# Patient Record
Sex: FEMALE | Race: WHITE | Employment: UNEMPLOYED | URBAN - METROPOLITAN AREA
[De-identification: names, ages, dates, MRNs, and addresses within clinical notes are randomized per-mention and may not be internally consistent; named-entity substitution may affect disease eponyms.]

---
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
## 2020-02-26 DIAGNOSIS — G47.00 INSOMNIA, UNSPECIFIED TYPE: ICD-10-CM

## 2020-02-26 RX ORDER — QUETIAPINE FUMARATE 100 MG/1
100 TABLET, FILM COATED ORAL
Qty: 30 TAB | Refills: 2 | Status: SHIPPED | OUTPATIENT
Start: 2020-02-26 | End: 2021-05-03 | Stop reason: ALTCHOICE

## 2020-02-26 NOTE — TELEPHONE ENCOUNTER
Requested Prescriptions     Pending Prescriptions Disp Refills    QUEtiapine (SEROQUEL) 100 mg tablet 30 Tab 2     Sig: Take 1 Tab by mouth nightly.      [facility] in [place]

## 2020-03-26 ENCOUNTER — VIRTUAL VISIT (OUTPATIENT)
Dept: NEUROLOGY | Age: 20
End: 2020-03-26

## 2020-03-26 VITALS — BODY MASS INDEX: 33.95 KG/M2 | HEIGHT: 65 IN

## 2020-03-26 RX ORDER — TOPIRAMATE 100 MG/1
100 CAPSULE, EXTENDED RELEASE ORAL DAILY
Qty: 30 CAP | Refills: 3 | Status: SHIPPED | OUTPATIENT
Start: 2020-03-26 | End: 2020-10-27

## 2020-03-26 NOTE — PATIENT INSTRUCTIONS

## 2020-03-26 NOTE — PROGRESS NOTES
Neurology Note    Chief Complaint   Patient presents with    Follow-up     headache       HPI/Subjective  Roderick Moody is a 23 y.o. female who presented to the neurology office for management of headaches. The patient has been having headaches since the age of 12.  It has been gradually progressing. The patient is having around 25 headaches a month. 2-3 days in a week as bad that lingers on for an additional 1 to 2 days. The headaches are in the left temporal area and it is throbbing in character. It is associated with Dizziness, photophobia, phonophobia and nausea. No vomiting. Interval history:  The patient is on Topamax 50 mg p.o. twice daily and states that her headaches are significantly better. She is having 2 headaches a month. She does complain of paresthesia in her arms 1 hour after she takes Topamax. She is drinking adequate water. No plans of getting pregnant. Baseline headache frequency: 25/month  Headache frequency now: 2 headaches a month    Risk Factors for headaches  Smoking: vapes  Coffee: 2/month  Tea: 2/month  Soda: 4-5 cans/day  Water: 10 glasses/day  Sleeps at 11 pm and wakes up at 2 pm.     Medications tried  Preventative therapy:  Propranolol 10 mg p.o. twice daily  Topamax    Abortive therapy:  Maxalt  Excedrin  Tylenol  Aleve      Current Outpatient Medications   Medication Sig    QUEtiapine (SEROQUEL) 100 mg tablet Take 1 Tab by mouth nightly.  sertraline (ZOLOFT) 50 mg tablet Take 1 Tab by mouth two (2) times a day.  topiramate (TOPAMAX) 25 mg tablet Wk 1 25 mg po qhs, wk 2 25 mg po bid, wk 3 25 mg am 50 mg qhs, wk 4 50 mg bid    levonorgestrel (KYLEENA) 17.5 mcg/24 hrs (5 yrs) 19.5 mg IUD IUD Kyleena    rizatriptan (MAXALT) 5 mg tablet TAKE 1 TABLET BY MOUTH ONCE AS NEEDED FOR MIGRAINE (1 DOSE)     No current facility-administered medications for this visit.       No Known Allergies  Past Medical History:   Diagnosis Date    Depression     Headache      No past surgical history on file. Family History   Problem Relation Age of Onset    Hypertension Mother         hyperthyroid    Thyroid Disease Mother     Collagen Dis Mother     Atrial Fibrillation Father     Thyroid Disease Brother         hyperthyroid    No Known Problems Brother     No Known Problems Brother      Social History     Tobacco Use    Smoking status: Former Smoker     Last attempt to quit: 2018     Years since quittin.7    Smokeless tobacco: Never Used    Tobacco comment: uses e cigs . .. vapes   Substance Use Topics    Alcohol use: Yes     Alcohol/week: 2.0 standard drinks     Types: 2 Shots of liquor per week     Comment: 2 x monthly    Drug use: Never       REVIEW OF SYSTEMS:   A ten system review of constitutional, cardiovascular, respiratory, musculoskeletal, endocrine, skin, SHEENT, genitourinary, psychiatric and neurologic systems was obtained and is unremarkable with the exception of headaches    EXAMINATION:   Visit Vitals  Ht 5' 5\" (1.651 m)   BMI 33.95 kg/m²        General:  Exam limited because of virtual visit. General appearance: Pt is in no acute distress     Neurological Examination:   Mental Status: AAO x3. Speech is fluent. Follows commands, has normal fund of knowledge, attention, short term recall, comprehension and insight. Cranial Nerves:  Extraocular movements are full. Facial movement intact. Hearing intact to conversation. Shoulder shrug symmetric. Tongue midline. Motor: Strength is symmetric in all 4 ext.      Sensation: Light touch - Normal    Coordination/Cerebellar: Intact to finger-nose-finger     Gait: Casual gait is normal.     Laboratory review:   Results for orders placed or performed in visit on 03/15/19   TSH 3RD GENERATION   Result Value Ref Range    TSH 0.761 0.450 - 7.310 uIU/mL   METABOLIC PANEL, COMPREHENSIVE   Result Value Ref Range    Glucose 84 65 - 99 mg/dL    BUN 9 6 - 20 mg/dL    Creatinine 0.66 0.57 - 1.00 mg/dL    GFR est non- >59 mL/min/1.73    GFR est  >59 mL/min/1.73    BUN/Creatinine ratio 14 9 - 23    Sodium 138 134 - 144 mmol/L    Potassium 4.5 3.5 - 5.2 mmol/L    Chloride 102 96 - 106 mmol/L    CO2 22 20 - 29 mmol/L    Calcium 9.6 8.7 - 10.2 mg/dL    Protein, total 7.0 6.0 - 8.5 g/dL    Albumin 4.4 3.5 - 5.5 g/dL    GLOBULIN, TOTAL 2.6 1.5 - 4.5 g/dL    A-G Ratio 1.7 1.2 - 2.2    Bilirubin, total <0.2 0.0 - 1.2 mg/dL    Alk. phosphatase 40 (L) 43 - 101 IU/L    AST (SGOT) 16 0 - 40 IU/L    ALT (SGPT) 12 0 - 32 IU/L   CBC WITH AUTOMATED DIFF   Result Value Ref Range    WBC 6.6 3.4 - 10.8 x10E3/uL    RBC 4.40 3.77 - 5.28 x10E6/uL    HGB 12.8 11.1 - 15.9 g/dL    HCT 39.4 34.0 - 46.6 %    MCV 90 79 - 97 fL    MCH 29.1 26.6 - 33.0 pg    MCHC 32.5 31.5 - 35.7 g/dL    RDW 13.5 12.3 - 15.4 %    PLATELET 123 373 - 310 x10E3/uL    NEUTROPHILS 84 Not Estab. %    Lymphocytes 12 Not Estab. %    MONOCYTES 3 Not Estab. %    EOSINOPHILS 1 Not Estab. %    BASOPHILS 0 Not Estab. %    ABS. NEUTROPHILS 5.5 1.4 - 7.0 x10E3/uL    Abs Lymphocytes 0.8 0.7 - 3.1 x10E3/uL    ABS. MONOCYTES 0.2 0.1 - 0.9 x10E3/uL    ABS. EOSINOPHILS 0.1 0.0 - 0.4 x10E3/uL    ABS. BASOPHILS 0.0 0.0 - 0.2 x10E3/uL    IMMATURE GRANULOCYTES 0 Not Estab. %    ABS. IMM. GRANS. 0.0 0.0 - 0.1 x10E3/uL   LIPID PANEL   Result Value Ref Range    Cholesterol, total 170 (H) 100 - 169 mg/dL    Triglyceride 126 (H) 0 - 89 mg/dL    HDL Cholesterol 52 >39 mg/dL    VLDL, calculated 25 5 - 40 mg/dL    LDL, calculated 93 0 - 109 mg/dL   HEMOGLOBIN A1C WITH EAG   Result Value Ref Range    Hemoglobin A1c 5.3 4.8 - 5.6 %    Estimated average glucose 105 mg/dL   CVD REPORT   Result Value Ref Range    INTERPRETATION Note        Imaging review:  None    Documentation review:  None    Assessment/Plan:   Rhonda Crawford is a 23 y.o. female who presented to the neurology office for management of headaches. Based on the description she does have chronic migraines.   She has not had any adequate trial of preventative medications. During the last visit she was started on Topamax and is presently taking 50 mg p.o. twice daily and the headaches have reduced from 25 a month to 2 headaches a month. She is having paresthesia related to Topamax. I am going to start the patient on Trokendi 100 mg p.o. nightly. We will see the patient back in 4 months. 3 most recent PHQ Screens 10/2/2019   Little interest or pleasure in doing things Several days   Feeling down, depressed, irritable, or hopeless Several days   Total Score PHQ 2 2     Primary care to address possible depression if PHQ-9 score is more than 9. ICD-10-CM ICD-9-CM    1. Chronic migraine G43.709 346.70       Thank you for allowing me to participate in the care of Ms. Fletcher. Please feel free to contact me if you have any questions. Sushma Bourne MD  Neurologist    CC: Pearl Mcghee MD  Fax: [phone]    This note was created using voice recognition software. Despite editing, there may be syntax errors. David Vegas was seen by synchronous (real-time) audio-video technology on 03/26/20. Consent:  She and/or her healthcare decision maker is aware that this patient-initiated Telehealth encounter is a billable service, with coverage as determined by her insurance carrier. She is aware that she may receive a bill and has provided verbal consent to proceed: Yes    I was in the office while conducting this encounter. Pursuant to the emergency declaration under the [facility], [facility] waiver authority and the [employer] Act, this Virtual  Visit was conducted, with patient's consent, to reduce the patient's risk of exposure to COVID-19 and provide continuity of care for an established patient.      Services were provided through a video synchronous discussion virtually to substitute for in-person clinic visit.

## 2020-03-31 ENCOUNTER — HOSPITAL ENCOUNTER (EMERGENCY)
Age: 20
Discharge: HOME | End: 2020-03-31
Attending: EMERGENCY MEDICINE | Admitting: EMERGENCY MEDICINE
Payer: COMMERCIAL

## 2020-03-31 VITALS
OXYGEN SATURATION: 99 % | HEART RATE: 97 BPM | DIASTOLIC BLOOD PRESSURE: 85 MMHG | RESPIRATION RATE: 16 BRPM | WEIGHT: 204.59 LBS | BODY MASS INDEX: 34.09 KG/M2 | SYSTOLIC BLOOD PRESSURE: 145 MMHG | HEIGHT: 65 IN | TEMPERATURE: 98.8 F

## 2020-03-31 DIAGNOSIS — S01.332A COMPLICATION OF LEFT EAR PIERCING, INITIAL ENCOUNTER: Primary | ICD-10-CM

## 2020-03-31 PROCEDURE — 99281 EMR DPT VST MAYX REQ PHY/QHP: CPT

## 2020-03-31 NOTE — DISCHARGE INSTRUCTIONS
Patient Education        Puncture Wounds: Care Instructions  Your Care Instructions    A puncture wound can happen anywhere on your body. These wounds tend to be narrower and deeper than cuts. A puncture wound is usually left open instead of being closed. This is because a puncture wound can be easily infected, and closing it can make infection even more likely. You will probably have a bandage over the wound. The doctor has checked you carefully, but problems can develop later. If you notice any problems or new symptoms, get medical treatment right away. Follow-up care is a key part of your treatment and safety. Be sure to make and go to all appointments, and call your doctor if you are having problems. It's also a good idea to know your test results and keep a list of the medicines you take. How can you care for yourself at home? · Keep the wound dry for the first 24 to 48 hours. After this, you can shower if your doctor okays it. Pat the wound dry. · Don't soak the wound, such as in a bathtub. Your doctor will tell you when it's safe to get the wound wet. · If your doctor told you how to care for your wound, follow your doctor's instructions. If you did not get instructions, follow this general advice:  ? After the first 24 to 48 hours, wash the wound with clean water 2 times a day. Don't use hydrogen peroxide or alcohol, which can slow healing. ? You may cover the wound with a thin layer of petroleum jelly, such as Vaseline, and a nonstick bandage. ? Apply more petroleum jelly and replace the bandage as needed. · Prop up the sore area on pillows anytime you sit or lie down during the next 3 days. Try to keep it above the level of your heart. This helps reduce swelling. · Avoid any activity that could cause your wound to get worse. · Be safe with medicines. Read and follow all instructions on the label. ? If the doctor gave you a prescription medicine for pain, take it as prescribed.   ? If you are not taking a prescription pain medicine, ask your doctor if you can take an over-the-counter medicine. · If your doctor prescribed antibiotics, take them as directed. Do not stop taking them just because you feel better. You need to take the full course of antibiotics. When should you call for help? Call your doctor now or seek immediate medical care if:    · You have new pain, or your pain gets worse.     · The wound starts to bleed, and blood soaks through the bandage. Oozing small amounts of blood is normal.     · The skin near the wound is cold or pale or changes color.     · You have tingling, weakness, or numbness near the wound.     · You have trouble moving the area near the wound.     · You have symptoms of infection, such as:  ? Increased pain, swelling, warmth, or redness around the wound. ? Red streaks leading from the wound. ? Pus draining from the wound. ? A fever.    Watch closely for changes in your health, and be sure to contact your doctor if:    · The wound is not closing (getting smaller).     · You do not get better as expected. Where can you learn more? Go to http://www.gray.com/  Enter V666 in the search box to learn more about \"Puncture Wounds: Care Instructions. \"  Current as of: June 26, 2019Content Version: 12.4  © 7404-0110 [employer]. Care instructions adapted under license by [employer] (which disclaims liability or warranty for this information). If you have questions about a medical condition or this instruction, always ask your healthcare professional. [place] [address] any warranty or liability for your use of this information. Patient Education   Wound Care: After Your Visit to the Emergency Room  Your Care Instructions  The care you need depends on the type of wound you have. Taking good care of your wound at home will help it heal quickly and will reduce your chance of infection.   Even though you have been released from the emergency room, you still need to watch for any problems. The doctor carefully checked you. But sometimes problems can develop later. If you have new symptoms, or if your symptoms do not get better, return to the emergency room or call your doctor right away. A visit to the emergency room is only one step in your treatment. Even if you feel better, you still need to do what your doctor recommends, such as going to all suggested follow-up appointments and taking medicines exactly as directed. This will help you recover and help prevent future problems. How can you care for yourself at home? · Clean the area with soap and water 2 times a day, or as your doctor tells you. Don't use hydrogen peroxide or alcohol, which can slow healing. ¨ Unless your doctor gives you other directions, cover the wound with a thin layer of antibiotic ointment, such as bacitracin, and a bandage. Do not use an ointment that contains neomycin, because it can irritate the skin. ¨ Apply more ointment and replace the bandage as your doctor tells you. ¨ If the bandage is stuck to a scab, soak it in warm water to soften the scab. This will make the bandage easier to remove. · Ask your doctor if you can take an over-the-counter pain medicine. Do not take two or more pain medicines at the same time unless the doctor told you to. · Some pain is normal with a wound, but do not ignore pain that is getting worse instead of better. You could have an infection. · Your doctor may have closed your wound with stitches (sutures), staples, or skin glue. ¨ If you have stitches, your doctor may remove them after several days to 2 weeks. Or you may have stitches that dissolve on their own. ¨ If you have staples, your doctor may remove them after 7 to 10 days. ¨ If your wound was closed with skin glue, the glue will wear off in a few days to 2 weeks. When should you call for help?   Return to the emergency room now if:  · You have signs of infection, such as:  ¨ Increased pain, swelling, warmth, or redness around the wound. ¨ Red streaks leading from the wound. ¨ Pus draining from the wound. ¨ Swollen lymph nodes in your neck, armpits, or groin. ¨ A fever. · The wound starts to bleed, and blood soaks through the bandage. (Oozing small amounts of blood is normal.)  Call your doctor today if:  · The wound is not getting better each day. Where can you learn more? Go to [email or web address]  Enter P907 in the search box to learn more about \"Wound Care: After Your Visit to the Emergency Room. \"   © 1851-2287 [employer]. Care instructions adapted under license by [employer] (which disclaims liability or warranty for this information). This care instruction is for use with your licensed healthcare professional. If you have questions about a medical condition or this instruction, always ask your healthcare professional. [place] [address] any warranty or liability for your use of this information.   Content Version: 9.3.18944; Last Revised: July 22, 2010

## 2020-03-31 NOTE — ED PROVIDER NOTES
EMERGENCY DEPARTMENT HISTORY AND PHYSICAL EXAM      Date: (Not on file)  Patient Name: Maudine Riedel    Please note that this dictation was completed with [employer], the computer voice recognition software. Quite often unanticipated grammatical, syntax, homophones, and other interpretive errors are inadvertently transcribed by the computer software. Please disregard these errors. Please excuse any errors that have escaped final proofreading. History of Presenting Illness     Chief Complaint   Patient presents with    Ear Pain     Pt c/o bleeding of her left ear x last night. Denies any pain or injury. History Provided By: Patient    HPI: Maudine Riedel, 23 y.o. female with PMHx significant for depression headache, presents ambulatory to the ED with cc of left ear bleeding that began last night. Patient reports that at night she was sleeping but she woke up because she felt some liquid coming from her ear. She will get her pillow and there is a small puddle of blood. Patient reports that she has not experienced any hearing loss or any trauma to the ear. She is unsure why this is happening. She reports that she has not experienced any further bleeding. She denies any pain, fever, chills. She does report that she has had a piercing to that year for the last year that she has not removed. PCP: Zoë Mcghee MD    There are no other complaints, changes, or physical findings at this time. Current Outpatient Medications   Medication Sig Dispense Refill    Trokendi  mg capsule Take 1 Cap by mouth daily. 30 Cap 3    QUEtiapine (SEROQUEL) 100 mg tablet Take 1 Tab by mouth nightly. 30 Tab 2    sertraline (ZOLOFT) 50 mg tablet Take 1 Tab by mouth two (2) times a day.  60 Tab 5    levonorgestrel (KYLEENA) 17.5 mcg/24 hrs (5 yrs) 19.5 mg IUD IUD Kyleena      rizatriptan (MAXALT) 5 mg tablet TAKE 1 TABLET BY MOUTH ONCE AS NEEDED FOR MIGRAINE (1 DOSE) 20 Tab 0       Past History Past Medical History:  Past Medical History:   Diagnosis Date    Depression     Headache        Past Surgical History:  No past surgical history on file. Family History:  Family History   Problem Relation Age of Onset    Hypertension Mother         hyperthyroid    Thyroid Disease Mother     Collagen Dis Mother     Atrial Fibrillation Father     Thyroid Disease Brother         hyperthyroid    No Known Problems Brother     No Known Problems Brother        Social History:  Social History     Tobacco Use    Smoking status: Former Smoker     Last attempt to quit: 2018     Years since quittin.7    Smokeless tobacco: Never Used    Tobacco comment: uses e cigs . .. vapes   Substance Use Topics    Alcohol use: Yes     Alcohol/week: 2.0 standard drinks     Types: 2 Shots of liquor per week     Comment: 2 x monthly    Drug use: Never       Allergies:  No Known Allergies      Review of Systems   Review of Systems   Constitutional: Negative for chills and fever. HENT: Positive for ear discharge (blood). Negative for congestion, dental problem, ear pain, hearing loss and rhinorrhea. Neurological: Negative for headaches. Physical Exam   Physical Exam  Vitals signs and nursing note reviewed. Constitutional:       General: She is not in acute distress. Appearance: She is well-developed. She is not diaphoretic. Comments: 23 y.o.  female in NAD  Communicates appropriately and in full sentences   HENT:      Head: Normocephalic and atraumatic. Right Ear: Tympanic membrane, ear canal and external ear normal. There is no impacted cerumen. Left Ear: Tympanic membrane, ear canal and external ear normal. There is no impacted cerumen. Ears:      Comments: Bar piercing to L upper ear that has blood from proximal piercing. No active bleeding, blood has formed scab. TM intact. No AOM or OE. No clinical concern for mastoiditis. Nose: No congestion or rhinorrhea. Eyes:      General:         Right eye: No discharge. Left eye: No discharge. Conjunctiva/sclera: Conjunctivae normal.      Pupils: Pupils are equal, round, and reactive to light. Neck:      Musculoskeletal: Normal range of motion and neck supple. Comments: No nuchal rigidity or meningeal signs  Pulmonary:      Effort: Pulmonary effort is normal. No respiratory distress. Musculoskeletal: Normal range of motion. General: No tenderness or deformity. Comments: No neurologic or vascular compromise on exam.    Skin:     General: Skin is warm and dry. Coloration: Skin is not pale. Findings: No erythema or rash. Neurological:      Mental Status: She is alert and oriented to person, place, and time. Psychiatric:         Behavior: Behavior normal.           Diagnostic Study Results     No formal testing initiated. Medical Decision Making   I am the first provider for this patient. I reviewed the vital signs, available nursing notes, past medical history, past surgical history, family history and social history. Vital Signs-Reviewed the patient's vital signs. Patient Vitals for the past 12 hrs:   Temp Pulse Resp BP SpO2   03/31/20 1509 98.8 °F (37.1 °C) 97 16 145/85 99 %         Records Reviewed: Nursing Notes and Old Medical Records    Provider Notes (Medical Decision Making):   DDx; piercing complication, AOM, tinnitus, OE.     ED Course:   Initial assessment performed. The patients presenting problems have been discussed, and they are in agreement with the care plan formulated and outlined with them. I have encouraged them to ask questions as they arise throughout their visit. DISCHARGE NOTE:  Rozina Santiago Chance's  results have been reviewed with her. She has been counseled regarding her diagnosis.   She verbally conveys understanding and agreement of the signs, symptoms, diagnosis, treatment and prognosis and additionally agrees to follow up as recommended with Dr. Brett Nieto MD in 24 - 48 hours. She also agrees with the care-plan and conveys that all of her questions have been answered. I have also put together some discharge instructions for her that include: 1) educational information regarding their diagnosis, 2) how to care for their diagnosis at home, as well a 3) list of reasons why they would want to return to the ED prior to their follow-up appointment, should their condition change. She and/or family's questions have been answered. I have encouraged them to see the official results in [facility]\" or to retrieve the specifics of their results from medical records. PLAN:  1. Return precautions as discussed  2. Follow-up with providers as directed  3. Medications as prescribed    Return to ED if worse     Diagnosis     Clinical Impression:   1. Complication of left ear piercing, initial encounter        Current Discharge Medication List          Follow-up Information     Follow up With Specialties Details Why Contact Info    Maranda Mcghee MD Family Practice Schedule an appointment as soon as possible for a visit in 2 days If symptoms worsen [address]  [address]  [phone]                This note will not be viewable in [address]

## 2020-07-20 ENCOUNTER — TELEPHONE (OUTPATIENT)
Dept: NEUROLOGY | Age: 20
End: 2020-07-20

## 2020-07-20 NOTE — TELEPHONE ENCOUNTER
Ariella Tomas [employer] 4[employer]0 RTL ([address])  Covered: Retail, Mail Order, Specialty Unknown: Long-Term Care               Member ID: [id number] 2000 - F     Group ID: [id number]      Group Name: Carmelo Ward [facility] 66 [id number] [place], [place] [address]    Use As Primary Coverage    N  ADV  BIN [id number]    Sent to [facility] - status pending.      Castro: Parth Lundy

## 2020-07-21 ENCOUNTER — TELEPHONE (OUTPATIENT)
Dept: NEUROLOGY | Age: 20
End: 2020-07-21

## 2020-07-22 ENCOUNTER — TELEPHONE (OUTPATIENT)
Dept: NEUROLOGY | Age: 20
End: 2020-07-22

## 2020-07-22 NOTE — TELEPHONE ENCOUNTER
Krissy Tobias [facility]  approval 7/21/20 - 10/21/20    PA [id number] [facility] to [facility] in [place].

## 2020-07-29 ENCOUNTER — OFFICE VISIT (OUTPATIENT)
Dept: NEUROLOGY | Age: 20
End: 2020-07-29

## 2020-07-29 VITALS
SYSTOLIC BLOOD PRESSURE: 142 MMHG | BODY MASS INDEX: 34.32 KG/M2 | HEART RATE: 100 BPM | OXYGEN SATURATION: 98 % | WEIGHT: 206 LBS | DIASTOLIC BLOOD PRESSURE: 82 MMHG | HEIGHT: 65 IN

## 2020-07-29 RX ORDER — SUMATRIPTAN 50 MG/1
TABLET, FILM COATED ORAL
Qty: 9 TAB | Refills: 2 | Status: SHIPPED | OUTPATIENT
Start: 2020-07-29 | End: 2021-02-02

## 2020-10-27 RX ORDER — TOPIRAMATE 100 MG/1
CAPSULE, EXTENDED RELEASE ORAL
Qty: 30 CAP | Refills: 3 | Status: SHIPPED | OUTPATIENT
Start: 2020-10-27 | End: 2021-02-02 | Stop reason: ALTCHOICE

## 2020-11-03 ENCOUNTER — TELEPHONE (OUTPATIENT)
Dept: NEUROLOGY | Age: 20
End: 2020-11-03

## 2020-11-03 NOTE — TELEPHONE ENCOUNTER
----- Message from Chris Guajardo sent at 11/3/2020  2:13 PM EST -----  Regarding: HALEY/REFILL  Contact: [phone]  Medication Refill    Caller (if not patient): N/A      Relationship of caller (if not patient): N/A      Best contact number(s): [phone]      Name of medication and dosage if known: Topamax 100 mg      Is patient out of this medication (yes/no): Yes      Pharmacy name: [facility])    Pharmacy listed in chart? (yes/no): Yes    Pharmacy phone number: N/A      Details to clarify the request: Refill request for Topamax 100 mg          Chris Guajardo

## 2020-11-23 NOTE — TELEPHONE ENCOUNTER
Sent Trokendi 100 mg to [facility] via [facility]. Status is pending for clinical return.   (Key: [id number])

## 2021-02-02 ENCOUNTER — VIRTUAL VISIT (OUTPATIENT)
Dept: NEUROLOGY | Age: 21
End: 2021-02-02
Payer: COMMERCIAL

## 2021-02-02 PROCEDURE — 99214 OFFICE O/P EST MOD 30 MIN: CPT | Performed by: PSYCHIATRY & NEUROLOGY

## 2021-02-02 RX ORDER — SUMATRIPTAN 50 MG/1
TABLET, FILM COATED ORAL
Qty: 9 TAB | Refills: 2 | Status: SHIPPED | OUTPATIENT
Start: 2021-02-02 | End: 2021-11-22 | Stop reason: SDUPTHER

## 2021-02-02 NOTE — PROGRESS NOTES
Neurology Note    Chief Complaint   Patient presents with    Follow-up     hasnt been taking medication trokendi d/t ins not covering     Migraine     2 a week , headaches daily        HPI/Subjective  Richi Khanna is a 21 y.o. female who presented to the neurology office for management of headaches. The patient has been having headaches since the age of 12.  It has been gradually progressing. The patient is having around 25 headaches a month. 2-3 days in a week as bad that lingers on for an additional 1 to 2 days. The headaches are in the left temporal area and it is throbbing in character. It is associated with Dizziness, photophobia, phonophobia and nausea. No vomiting. Interval history:  Patient is off Trokendi for the last 3 months and her headaches have worsened. She is now having daily headaches. When she was on Trokendi she was having 3-4 headaches a month. Baseline headache frequency: 25/month  Headache frequency now: daily headaches and 1-2 bad ones/week    Risk Factors for headaches  Smoking: vapes  Coffee: 2/month  Tea: 2/month  Soda: 4-5 cans/day  Water: 10 glasses/day  Sleeps at 11 pm and wakes up at 2 pm.     Medications tried  Preventative therapy:  Propranolol 10 mg p.o. twice daily  Topamax  Trokendi    Abortive therapy:  Maxalt  Excedrin  Tylenol  Aleve      Current Outpatient Medications   Medication Sig    SUMAtriptan (IMITREX) 50 mg tablet 1 tab at onset of headache, can repeat X 1 in 2 hrs if HA persists. Not more than 10 days/month.  QUEtiapine (SEROQUEL) 100 mg tablet Take 1 Tab by mouth nightly.  sertraline (ZOLOFT) 50 mg tablet Take 1 Tab by mouth two (2) times a day.  levonorgestrel (KYLEENA) 17.5 mcg/24 hrs (5 yrs) 19.5 mg IUD IUD Kyleena    Trokendi  mg capsule TAKE 1 CAPSULE BY MOUTH EVERY DAY     No current facility-administered medications for this visit.       No Known Allergies  Past Medical History:   Diagnosis Date    Depression     Headache History reviewed. No pertinent surgical history. Family History   Problem Relation Age of Onset    Hypertension Mother         hyperthyroid    Thyroid Disease Mother     Collagen Dis Mother     Atrial Fibrillation Father     Thyroid Disease Brother         hyperthyroid    No Known Problems Brother     No Known Problems Brother      Social History     Tobacco Use    Smoking status: Former Smoker     Quit date: 2018     Years since quittin.5    Smokeless tobacco: Never Used    Tobacco comment: uses e cigs . .. vapes   Substance Use Topics    Alcohol use: Yes     Alcohol/week: 2.0 standard drinks     Types: 2 Shots of liquor per week     Comment: 2 x monthly    Drug use: Never       REVIEW OF SYSTEMS:   A ten system review of constitutional, cardiovascular, respiratory, musculoskeletal, endocrine, skin, SHEENT, genitourinary, psychiatric and neurologic systems was obtained and is unremarkable with the exception of headaches    EXAMINATION:   There were no vitals taken for this visit. General:  Exam limited because of virtual visit. General appearance: Pt is in no acute distress     Neurological Examination:   Mental Status: AAO x3. Speech is fluent. Follows commands, has normal fund of knowledge, attention, short term recall, comprehension and insight. Cranial Nerves:  Extraocular movements are full. Facial movement intact. Hearing intact to conversation. Shoulder shrug symmetric. Tongue midline. Motor: Strength is symmetric in all 4 ext.      Sensation: Light touch - Normal    Coordination/Cerebellar: Intact to finger-nose-finger     Gait: Casual gait is normal.     Laboratory review:   Results for orders placed or performed in visit on 03/15/19   TSH 3RD GENERATION   Result Value Ref Range    TSH 0.761 0.450 - 5.324 uIU/mL   METABOLIC PANEL, COMPREHENSIVE   Result Value Ref Range    Glucose 84 65 - 99 mg/dL    BUN 9 6 - 20 mg/dL    Creatinine 0.66 0.57 - 1.00 mg/dL    GFR est non- >59 mL/min/1.73    GFR est  >59 mL/min/1.73    BUN/Creatinine ratio 14 9 - 23    Sodium 138 134 - 144 mmol/L    Potassium 4.5 3.5 - 5.2 mmol/L    Chloride 102 96 - 106 mmol/L    CO2 22 20 - 29 mmol/L    Calcium 9.6 8.7 - 10.2 mg/dL    Protein, total 7.0 6.0 - 8.5 g/dL    Albumin 4.4 3.5 - 5.5 g/dL    GLOBULIN, TOTAL 2.6 1.5 - 4.5 g/dL    A-G Ratio 1.7 1.2 - 2.2    Bilirubin, total <0.2 0.0 - 1.2 mg/dL    Alk. phosphatase 40 (L) 43 - 101 IU/L    AST (SGOT) 16 0 - 40 IU/L    ALT (SGPT) 12 0 - 32 IU/L   CBC WITH AUTOMATED DIFF   Result Value Ref Range    WBC 6.6 3.4 - 10.8 x10E3/uL    RBC 4.40 3.77 - 5.28 x10E6/uL    HGB 12.8 11.1 - 15.9 g/dL    HCT 39.4 34.0 - 46.6 %    MCV 90 79 - 97 fL    MCH 29.1 26.6 - 33.0 pg    MCHC 32.5 31.5 - 35.7 g/dL    RDW 13.5 12.3 - 15.4 %    PLATELET 360 324 - 966 x10E3/uL    NEUTROPHILS 84 Not Estab. %    Lymphocytes 12 Not Estab. %    MONOCYTES 3 Not Estab. %    EOSINOPHILS 1 Not Estab. %    BASOPHILS 0 Not Estab. %    ABS. NEUTROPHILS 5.5 1.4 - 7.0 x10E3/uL    Abs Lymphocytes 0.8 0.7 - 3.1 x10E3/uL    ABS. MONOCYTES 0.2 0.1 - 0.9 x10E3/uL    ABS. EOSINOPHILS 0.1 0.0 - 0.4 x10E3/uL    ABS. BASOPHILS 0.0 0.0 - 0.2 x10E3/uL    IMMATURE GRANULOCYTES 0 Not Estab. %    ABS. IMM. GRANS. 0.0 0.0 - 0.1 x10E3/uL   LIPID PANEL   Result Value Ref Range    Cholesterol, total 170 (H) 100 - 169 mg/dL    Triglyceride 126 (H) 0 - 89 mg/dL    HDL Cholesterol 52 >39 mg/dL    VLDL, calculated 25 5 - 40 mg/dL    LDL, calculated 93 0 - 109 mg/dL   HEMOGLOBIN A1C WITH EAG   Result Value Ref Range    Hemoglobin A1c 5.3 4.8 - 5.6 %    Estimated average glucose 105 mg/dL   CVD REPORT   Result Value Ref Range    INTERPRETATION Note        Imaging review:  None    Documentation review:  None    Assessment/Plan:   Zaina Brown is a [address] y.o. female who presented to the neurology office for management of headaches. Based on the description she does have chronic migraines.       She was on Topamax in the past but was having side effects. I switched her to Trokendi and she was doing very well on 100 mg daily. She has not been able to get it over the last 3 months because of insurance. Now her insurance is changed. Because she was of the Trokendi, she is having daily headaches now. I am going to start her back on 50 mg daily for a month and then 100 mg daily. She is now on Trokendi 100 mg daily. Follow-up in 3 months          3 most recent PHQ Screens 2/2/2021   Little interest or pleasure in doing things Several days   Feeling down, depressed, irritable, or hopeless More than half the days   Total Score PHQ 2 3   Trouble falling or staying asleep, or sleeping too much Several days   Feeling tired or having little energy Several days   Poor appetite, weight loss, or overeating Not at all   Feeling bad about yourself - or that you are a failure or have let yourself or your family down Not at all   Trouble concentrating on things such as school, work, reading, or watching TV Not at all   Moving or speaking so slowly that other people could have noticed; or the opposite being so fidgety that others notice Not at all   Thoughts of being better off dead, or hurting yourself in some way Not at all   PHQ 9 Score 5   How difficult have these problems made it for you to do your work, take care of your home and get along with others Not difficult at all     Primary care to address possible depression if PHQ-9 score is more than 9. ICD-10-CM ICD-9-CM    1. Chronic migraine  G43.709 346.70       Thank you for allowing me to participate in the care of Ms. Fletcher. Please feel free to contact me if you have any questions. Anup Rogers MD  Neurologist    CC: Erika Mcghee MD  Fax: [phone]    This note was created using voice recognition software. Despite editing, there may be syntax errors. Linda Muller was seen by synchronous (real-time) audio-video technology on 02/02/21. Consent:  She and/or her healthcare decision maker is aware that this patient-initiated Telehealth encounter is a billable service, with coverage as determined by her insurance carrier. She is aware that she may receive a bill and has provided verbal consent to proceed: Yes    I was in the office while conducting this encounter. Pursuant to the emergency declaration under the [facility], [facility] waiver authority and the [employer] and [employer] Act, this Virtual  Visit was conducted, with patient's consent, to reduce the patient's risk of exposure to COVID-19 and provide continuity of care for an established patient. Services were provided through a video synchronous discussion virtually to substitute for in-person clinic visit.

## 2021-02-04 NOTE — TELEPHONE ENCOUNTER
Requested Prescriptions     Pending Prescriptions Disp Refills    topiramate ER (Trokendi XR) 50 mg capsule 30 Cap 0     Sig: Take 1 Cap by mouth daily.

## 2021-03-02 ENCOUNTER — TELEPHONE (OUTPATIENT)
Dept: NEUROLOGY | Age: 21
End: 2021-03-02

## 2021-05-03 ENCOUNTER — TELEPHONE (OUTPATIENT)
Dept: NEUROLOGY | Age: 21
End: 2021-05-03

## 2021-05-03 ENCOUNTER — OFFICE VISIT (OUTPATIENT)
Dept: NEUROLOGY | Age: 21
End: 2021-05-03
Payer: COMMERCIAL

## 2021-05-03 VITALS — WEIGHT: 204 LBS | BODY MASS INDEX: 33.95 KG/M2

## 2021-05-03 PROCEDURE — 99214 OFFICE O/P EST MOD 30 MIN: CPT | Performed by: PSYCHIATRY & NEUROLOGY

## 2021-05-03 NOTE — PROGRESS NOTES
Neurology Note    Chief Complaint   Patient presents with    Follow-up     medication     Migraine       HPI/Subjective  Rosa Maria Mcmahon is a 24 y.o. female who presented to the neurology office for management of headaches. The patient has been having headaches since the age of 12.  It has been gradually progressing. The patient is having around 25 headaches a month. 2-3 days in a week as bad that lingers on for an additional 1 to 2 days. The headaches are in the left temporal area and it is throbbing in character. It is associated with Dizziness, photophobia, phonophobia and nausea. No vomiting. Interval history: In the past she has tried Trokendi but now she has not been located from the pharmacy because of insurance. She is having daily headaches without it. Baseline headache frequency: 25/month  Headache frequency now: daily headaches and 1-2 bad ones/week    Risk Factors for headaches  Smoking: vapes  Coffee: 2/month  Tea: 2/month  Soda: 4-5 cans/day  Water: 10 glasses/day  Sleeps at 11 pm and wakes up at 2 pm.     Medications tried  Preventative therapy:  Propranolol 10 mg p.o. twice daily  Topamax  Trokendi    Abortive therapy:  Maxalt  Excedrin  Tylenol  Aleve      Current Outpatient Medications   Medication Sig    SUMAtriptan (IMITREX) 50 mg tablet TAKE 1 TAB BY MOUTH AT ONSET OF HEADACHE, CAN REPEAT X 1 IN 2 HRS IF HA PERSISTS. NOT MORE THAN 10 DAYS/MONTH.  levonorgestrel (KYLEENA) 17.5 mcg/24 hrs (5 yrs) 19.5 mg IUD IUD Kyleena     No current facility-administered medications for this visit. No Known Allergies  Past Medical History:   Diagnosis Date    Depression     Headache      History reviewed. No pertinent surgical history.   Family History   Problem Relation Age of Onset    Hypertension Mother         hyperthyroid    Thyroid Disease Mother     Collagen Dis Mother     Atrial Fibrillation Father     Thyroid Disease Brother         hyperthyroid    No Known Problems Brother     No Known Problems Brother      Social History     Tobacco Use    Smoking status: Former Smoker     Quit date: 2018     Years since quittin.8    Smokeless tobacco: Never Used    Tobacco comment: uses e cigs . .. vapes   Substance Use Topics    Alcohol use: Yes     Alcohol/week: 2.0 standard drinks     Types: 2 Shots of liquor per week     Comment: 2 x monthly    Drug use: Never       REVIEW OF SYSTEMS:   A ten system review of constitutional, cardiovascular, respiratory, musculoskeletal, endocrine, skin, SHEENT, genitourinary, psychiatric and neurologic systems was obtained and is unremarkable with the exception of headaches    EXAMINATION:   Visit Vitals  Wt 204 lb (92.5 kg)   BMI 33.95 kg/m²        General:  Exam limited because of virtual visit. General appearance: Pt is in no acute distress     Neurological Examination:   Mental Status: AAO x3. Speech is fluent. Follows commands, has normal fund of knowledge, attention, short term recall, comprehension and insight. Cranial Nerves:  Extraocular movements are full. Facial movement intact. Hearing intact to conversation. Shoulder shrug symmetric. Tongue midline. Motor: Strength is symmetric in all 4 ext.      Sensation: Light touch - Normal    Coordination/Cerebellar: Intact to finger-nose-finger     Gait: Casual gait is normal.     Laboratory review:   Results for orders placed or performed in visit on 03/15/19   TSH 3RD GENERATION   Result Value Ref Range    TSH 0.761 0.450 - 9.545 uIU/mL   METABOLIC PANEL, COMPREHENSIVE   Result Value Ref Range    Glucose 84 65 - 99 mg/dL    BUN 9 6 - 20 mg/dL    Creatinine 0.66 0.57 - 1.00 mg/dL    GFR est non- >59 mL/min/1.73    GFR est  >59 mL/min/1.73    BUN/Creatinine ratio 14 9 - 23    Sodium 138 134 - 144 mmol/L    Potassium 4.5 3.5 - 5.2 mmol/L    Chloride 102 96 - 106 mmol/L    CO2 22 20 - 29 mmol/L    Calcium 9.6 8.7 - 10.2 mg/dL    Protein, total 7.0 6.0 - 8.5 g/dL Albumin 4.4 3.5 - 5.5 g/dL    GLOBULIN, TOTAL 2.6 1.5 - 4.5 g/dL    A-G Ratio 1.7 1.2 - 2.2    Bilirubin, total <0.2 0.0 - 1.2 mg/dL    Alk. phosphatase 40 (L) 43 - 101 IU/L    AST (SGOT) 16 0 - 40 IU/L    ALT (SGPT) 12 0 - 32 IU/L   CBC WITH AUTOMATED DIFF   Result Value Ref Range    WBC 6.6 3.4 - 10.8 x10E3/uL    RBC 4.40 3.77 - 5.28 x10E6/uL    HGB 12.8 11.1 - 15.9 g/dL    HCT 39.4 34.0 - 46.6 %    MCV 90 79 - 97 fL    MCH 29.1 26.6 - 33.0 pg    MCHC 32.5 31.5 - 35.7 g/dL    RDW 13.5 12.3 - 15.4 %    PLATELET 248 969 - 720 x10E3/uL    NEUTROPHILS 84 Not Estab. %    Lymphocytes 12 Not Estab. %    MONOCYTES 3 Not Estab. %    EOSINOPHILS 1 Not Estab. %    BASOPHILS 0 Not Estab. %    ABS. NEUTROPHILS 5.5 1.4 - 7.0 x10E3/uL    Abs Lymphocytes 0.8 0.7 - 3.1 x10E3/uL    ABS. MONOCYTES 0.2 0.1 - 0.9 x10E3/uL    ABS. EOSINOPHILS 0.1 0.0 - 0.4 x10E3/uL    ABS. BASOPHILS 0.0 0.0 - 0.2 x10E3/uL    IMMATURE GRANULOCYTES 0 Not Estab. %    ABS. IMM. GRANS. 0.0 0.0 - 0.1 x10E3/uL   LIPID PANEL   Result Value Ref Range    Cholesterol, total 170 (H) 100 - 169 mg/dL    Triglyceride 126 (H) 0 - 89 mg/dL    HDL Cholesterol 52 >39 mg/dL    VLDL, calculated 25 5 - 40 mg/dL    LDL, calculated 93 0 - 109 mg/dL   HEMOGLOBIN A1C WITH EAG   Result Value Ref Range    Hemoglobin A1c 5.3 4.8 - 5.6 %    Estimated average glucose 105 mg/dL   CVD REPORT   Result Value Ref Range    INTERPRETATION Note        Imaging review:  None    Documentation review:  None    Assessment/Plan:   Froylan Navarro is a 24 y.o. female who presented to the neurology office for management of headaches. Based on the description she does have chronic migraines. She was on Topamax in the past but was having side effects. She was switched to Trokendi last year and it helped her but she has not been able to get it this year as insurance is not covering it. She wants the office to peer to peer/prior authorization. We will order it again and see what happens.   I did discuss with her about [facility] but she wants to wait and see if Krissy will go through. Follow-up in 3 to 4 months           3 most recent PHQ Screens 5/3/2021   PHQ Not Done Active Diagnosis of Depression or Bipolar Disorder   Little interest or pleasure in doing things -   Feeling down, depressed, irritable, or hopeless -   Total Score PHQ 2 -   Trouble falling or staying asleep, or sleeping too much -   Feeling tired or having little energy -   Poor appetite, weight loss, or overeating -   Feeling bad about yourself - or that you are a failure or have let yourself or your family down -   Trouble concentrating on things such as school, work, reading, or watching TV -   Moving or speaking so slowly that other people could have noticed; or the opposite being so fidgety that others notice -   Thoughts of being better off dead, or hurting yourself in some way -   PHQ 9 Score -   How difficult have these problems made it for you to do your work, take care of your home and get along with others -     Primary care to address possible depression if PHQ-9 score is more than 9. ICD-10-CM ICD-9-CM    1. Chronic migraine  G43.709 346.70       Thank you for allowing me to participate in the care of Ms. Alejandre. Please feel free to contact me if you have any questions. Raj Frankel, MD  Neurologist    CC: Montrell Mcghee MD  Fax: [phone]    This note was created using voice recognition software. Despite editing, there may be syntax errors.

## 2021-05-03 NOTE — PROGRESS NOTES
Chief Complaint   Patient presents with    Follow-up     medication     Migraine      Patient stated she has not yet been able to start the Trokendi due to needing a PA.

## 2021-05-07 ENCOUNTER — TELEPHONE (OUTPATIENT)
Dept: NEUROLOGY | Age: 21
End: 2021-05-07

## 2021-05-07 NOTE — TELEPHONE ENCOUNTER
Re: Trokendi XR    [facility] Key# [id number]    [facility] and sent with most recent progress note. Awaiting update.

## 2021-05-10 NOTE — TELEPHONE ENCOUNTER
Janes Pa approval through [facility]. Request Reference Number: [id number]. TROKENDI XR CAP 50MG is approved through 05/07/2022.     Faxed approval notification to [address], [address], [address] [place] [address]   Phone:  [phone]  Fax:  [phone]

## 2021-08-09 PROBLEM — E66.9 OBESITY: Status: ACTIVE | Noted: 2018-04-26

## 2021-11-22 DIAGNOSIS — G43.709 CHRONIC MIGRAINE W/O AURA W/O STATUS MIGRAINOSUS, NOT INTRACTABLE: ICD-10-CM

## 2021-11-23 RX ORDER — SUMATRIPTAN 50 MG/1
TABLET, FILM COATED ORAL
Qty: 9 TABLET | Refills: 2 | Status: SHIPPED | OUTPATIENT
Start: 2021-11-23

## 2021-12-06 ENCOUNTER — OFFICE VISIT (OUTPATIENT)
Dept: NEUROLOGY | Age: 21
End: 2021-12-06
Payer: COMMERCIAL

## 2021-12-06 VITALS
SYSTOLIC BLOOD PRESSURE: 120 MMHG | RESPIRATION RATE: 16 BRPM | DIASTOLIC BLOOD PRESSURE: 98 MMHG | OXYGEN SATURATION: 99 % | HEART RATE: 110 BPM

## 2021-12-06 DIAGNOSIS — G43.709 CHRONIC MIGRAINE W/O AURA W/O STATUS MIGRAINOSUS, NOT INTRACTABLE: Primary | ICD-10-CM

## 2021-12-06 DIAGNOSIS — F32.0 CURRENT MILD EPISODE OF MAJOR DEPRESSIVE DISORDER WITHOUT PRIOR EPISODE (HCC): ICD-10-CM

## 2021-12-06 PROCEDURE — 99214 OFFICE O/P EST MOD 30 MIN: CPT | Performed by: PSYCHIATRY & NEUROLOGY

## 2021-12-06 RX ORDER — DULOXETIN HYDROCHLORIDE 60 MG/1
60 CAPSULE, DELAYED RELEASE ORAL DAILY
Qty: 30 CAPSULE | Refills: 3 | Status: SHIPPED | OUTPATIENT
Start: 2021-12-06 | End: 2021-12-28 | Stop reason: SDUPTHER

## 2021-12-06 RX ORDER — TOPIRAMATE 100 MG/1
100 CAPSULE, EXTENDED RELEASE ORAL DAILY
Qty: 180 CAPSULE | Refills: 3 | Status: SHIPPED | OUTPATIENT
Start: 2021-12-06

## 2021-12-06 RX ORDER — FLUTICASONE PROPIONATE 50 MCG
SPRAY, SUSPENSION (ML) NASAL
COMMUNITY
End: 2021-12-06

## 2021-12-06 RX ORDER — BENZONATATE 100 MG/1
CAPSULE ORAL
COMMUNITY
End: 2021-12-06

## 2021-12-06 NOTE — PROGRESS NOTES
Chief Complaint   Patient presents with    Follow-up    Migraine     happening more recently, more stress at work, sleeping atleast 4 hours a night, drinks 1 caffenated drink a day

## 2021-12-06 NOTE — PROGRESS NOTES
Follow-up Visit    Name Bita Zacarias Age 24 y.o. MRN [id number]  2000       Chief Complaint: Migraines    Returned for a follow up visit. She was followed by Dr. Kalyan Zhao for migraines. She would like to resume 100 mg of Trokendi daily as she was better controlled at that dose. She also suffers from pronounced anxiety which is disruptive at work. She avoids going to stores because of her social phobia. She requested that I refer her to a psychiatrist.  She has tried different medications for anxiety and her current regimen is ineffective. I quizzed her about the SSRIs that she has tried and she has not tried many. We discussed possibly starting Cymbalta and she was open to this. Assesment and Plan  1. Chronic migraine w/o aura w/o status migrainosus, not intractable    - Trokendi  mg capsule; Take 1 Capsule by mouth daily. Dispense: 180 Capsule; Refill: 3    2. Current mild episode of major depressive disorder without prior episode (HCC)    - REFERRAL TO PSYCHIATRY  - DULoxetine (CYMBALTA) 60 mg capsule; Take 1 Capsule by mouth daily. Dispense: 30 Capsule; Refill: 3  Medication, rationale, route of administration, adverse reactions and alternatives were discussed in detail and all questions were answered. Allergies  Patient has no known allergies. Medications  Current Outpatient Medications   Medication Sig    SUMAtriptan (IMITREX) 50 mg tablet TAKE 1 TAB BY MOUTH AT ONSET OF HEADACHE, CAN REPEAT X 1 IN 2 HRS IF HA PERSISTS. NOT MORE THAN 10 DAYS/MONTH.  topiramate ER (TROKENDI XR) 50 mg capsule Take 1 Cap by mouth daily.  levonorgestrel (KYLEENA) 17.5 mcg/24 hrs (5 yrs) 19.5 mg IUD IUD Kyleena     No current facility-administered medications for this visit. Medical History  Past Medical History:   Diagnosis Date    Depression     Depression     Headache     Obesity 2018       Review of Systems   Eyes: Negative for blurred vision and double vision. Respiratory: Negative for cough and shortness of breath. Cardiovascular: Negative for chest pain, palpitations and orthopnea. Gastrointestinal: Negative for nausea and vomiting. Neurological: Negative for dizziness and headaches. Psychiatric/Behavioral: Positive for depression. The patient is nervous/anxious. Exam:  Visit Vitals  BP (!) 120/98 (BP 1 Location: Left arm, BP Patient Position: Sitting, BP Cuff Size: Adult)   Pulse (!) 110   Resp 16   SpO2 99%        General: Well developed, well nourished. Patient in no apparent distress   Head: Normocephalic, atraumatic, anicteric sclera   Neck Normal ROM, No thyromegally   Cardiac: Regular rate and rhythm   Ext: No pedal edema   Skin: Supple no rash     NeurologicExam:  Mental Status: Alert and oriented to person place and time   Speech: Fluent no aphasia or dysarthria. Cranial Nerves:  II - XII Intact   Motor:  Full and symmetric strength . Normal bulk and tone. Sensory:   Symmetric and intact    Gait:  Gait is balanced and fluid with normal arm swing. Tremor:   No tremor noted. Cerebellar:  Coordination intact.           Lab Review  Lab Results   Component Value Date/Time    WBC 6.6 03/15/2019 10:38 AM    HCT 39.4 03/15/2019 10:38 AM    HGB 12.8 03/15/2019 10:38 AM    PLATELET 049 36/05/5067 10:38 AM       Lab Results   Component Value Date/Time    Sodium 138 03/15/2019 10:38 AM    Potassium 4.5 03/15/2019 10:38 AM    Chloride 102 03/15/2019 10:38 AM    CO2 22 03/15/2019 10:38 AM    Glucose 84 03/15/2019 10:38 AM    BUN 9 03/15/2019 10:38 AM    Creatinine 0.66 03/15/2019 10:38 AM    Calcium 9.6 03/15/2019 10:38 AM         Lab Results   Component Value Date/Time    Hemoglobin A1c 5.3 03/15/2019 10:38 AM          Lab Results   Component Value Date/Time    Cholesterol, total 170 (H) 03/15/2019 10:38 AM    HDL Cholesterol 52 03/15/2019 10:38 AM    LDL, calculated 93 03/15/2019 10:38 AM    VLDL, calculated 25 03/15/2019 10:38 AM    Triglyceride 126 (H) 03/15/2019 10:38 AM

## 2021-12-28 DIAGNOSIS — F32.0 CURRENT MILD EPISODE OF MAJOR DEPRESSIVE DISORDER WITHOUT PRIOR EPISODE (HCC): ICD-10-CM

## 2021-12-29 RX ORDER — DULOXETIN HYDROCHLORIDE 60 MG/1
60 CAPSULE, DELAYED RELEASE ORAL DAILY
Qty: 90 CAPSULE | Refills: 1 | Status: SHIPPED | OUTPATIENT
Start: 2021-12-29

## 2022-03-19 PROBLEM — E66.9 OBESITY: Status: ACTIVE | Noted: 2018-04-26

## 2023-05-16 RX ORDER — TOPIRAMATE 50 MG/1
50 CAPSULE, EXTENDED RELEASE ORAL DAILY
COMMUNITY
Start: 2021-05-03

## 2023-05-16 RX ORDER — DULOXETIN HYDROCHLORIDE 60 MG/1
60 CAPSULE, DELAYED RELEASE ORAL DAILY
COMMUNITY
Start: 2021-12-29

## 2023-05-16 RX ORDER — SUMATRIPTAN 50 MG/1
TABLET, FILM COATED ORAL
COMMUNITY
Start: 2021-11-23

## 2023-05-16 RX ORDER — TOPIRAMATE 100 MG/1
100 CAPSULE, EXTENDED RELEASE ORAL DAILY
COMMUNITY
Start: 2021-12-06

## 2024-03-18 NOTE — PROGRESS NOTES
Confirmed with the participant which designated provider they would like study results shared with (Latanya Santizo). Patient will have an opportunity to share the results with any providers of their choosing in the future by accessing their results from [facility] [employer].    Subjective:     Chief Complaint   Patient presents with    Other     Infected Belly Button Ring. 2/6/2017        She  is a 16 y.o. female who presents for evaluation of potential belly button piercing infection. Was pierced on 2/6/17 and had no issues with infections or swelling since piercing. Began to have some redness around the piercing 3-4 days ago and says that when she squeezed on it last night she had some \"pus came out\". No pain noted, some tenderness around the peircing. She takes out weekly to clean the area and the stud. Has been cleaning daily for the past few days and has been applying neosporin since last night, says that the area actually looks better today and does not have any pain. ROS  Gen - no fever/chills  Abd- no abdominal pain, no N/V/D  Resp - no dyspnea or cough  CV - no chest pain or MAC  Rest per HPI     History reviewed. No pertinent past medical history. History reviewed. No pertinent surgical history. No current outpatient prescriptions on file prior to visit. No current facility-administered medications on file prior to visit. Objective:     Vitals:    05/24/17 1126   BP: 120/80   Pulse: 88   Resp: 16   Temp: 98.6 °F (37 °C)   TempSrc: Oral   Weight: 182 lb 3.2 oz (82.6 kg)   Height: 5' 5.5\" (1.664 m)     Gen: alert, oriented, no acute distress  Head: normocephalic, atraumatic  Neck: supple, no lymphadenopathy  Resp: no increased work of breathing   Abd: soft, not tender, not distended. No hepatosplenomegaly. Normal bowel sounds. No hernias. Umbilical piercing:  No swelling or signs of abscess, no streaking,there is minimal erythema to the insertion/exit site of piercing only, no drainage noted. Neuro: cranial nerves intact, normal strength and movement in all extremities  Skin: no lesion or rash otherwise.          Assessment/ Plan:   Differential diagnosis and treatment options reviewed with patient who is in agreement with treatment plan as outlined below.    ICD-10-CM ICD-9-CM    1. History of body piercing Z98.890 V45.89        Advised to remove piercing for a couple days while trying to assess for infection. Area does not appear infected at this time, she does not want to remove the piercing at this time. Will do warm compresses and antibiotic ointment and will call if redness, swelling, pain, drainage, or fever. Advised that if area worsens, she will need to take piercing out. Instructions for potential piercing infection given in AVS.    I have discussed the diagnosis with the patient and the intended plan as seen in the above orders. The patient has received an after-visit summary and questions were answered concerning future plans. I have discussed medication side effects and warnings with the patient as well. The patient verbalizes understanding and agreement with the plan. Follow-up Disposition:  Return if symptoms worsen or fail to improve.

## 2024-12-27 ENCOUNTER — HOSPITAL ENCOUNTER (EMERGENCY)
Facility: HOSPITAL | Age: 24
Discharge: LEFT BEFORE DISCHARGE | End: 2024-12-27

## 2024-12-27 VITALS
TEMPERATURE: 100.9 F | BODY MASS INDEX: 36.53 KG/M2 | OXYGEN SATURATION: 97 % | HEIGHT: 66 IN | RESPIRATION RATE: 16 BRPM | WEIGHT: 227.29 LBS | DIASTOLIC BLOOD PRESSURE: 80 MMHG | SYSTOLIC BLOOD PRESSURE: 150 MMHG | HEART RATE: 125 BPM

## 2024-12-27 LAB
FLUAV RNA SPEC QL NAA+PROBE: DETECTED
FLUBV RNA SPEC QL NAA+PROBE: NOT DETECTED
SARS-COV-2 RNA RESP QL NAA+PROBE: NOT DETECTED
SOURCE: ABNORMAL

## 2024-12-27 PROCEDURE — 87636 SARSCOV2 & INF A&B AMP PRB: CPT

## 2024-12-27 RX ORDER — ACETAMINOPHEN 325 MG/1
650 TABLET ORAL
Status: DISCONTINUED | OUTPATIENT
Start: 2024-12-27 | End: 2024-12-27 | Stop reason: HOSPADM

## 2024-12-27 ASSESSMENT — PAIN - FUNCTIONAL ASSESSMENT: PAIN_FUNCTIONAL_ASSESSMENT: 0-10

## 2024-12-27 ASSESSMENT — PAIN SCALES - GENERAL: PAINLEVEL_OUTOF10: 5
